# Patient Record
Sex: MALE
[De-identification: names, ages, dates, MRNs, and addresses within clinical notes are randomized per-mention and may not be internally consistent; named-entity substitution may affect disease eponyms.]

---

## 2021-11-17 ENCOUNTER — HOSPITAL ENCOUNTER (INPATIENT)
Dept: HOSPITAL 5 - ED | Age: 46
LOS: 4 days | Discharge: HOME HEALTH | DRG: 603 | End: 2021-11-21
Attending: INTERNAL MEDICINE | Admitting: INTERNAL MEDICINE
Payer: SELF-PAY

## 2021-11-17 DIAGNOSIS — I10: ICD-10-CM

## 2021-11-17 DIAGNOSIS — M86.8X7: ICD-10-CM

## 2021-11-17 DIAGNOSIS — L03.115: Primary | ICD-10-CM

## 2021-11-17 LAB
ALBUMIN SERPL-MCNC: 4.2 G/DL (ref 3.9–5)
ALT SERPL-CCNC: 27 UNITS/L (ref 7–56)
BASOPHILS # (AUTO): 0.1 K/MM3 (ref 0–0.1)
BASOPHILS NFR BLD AUTO: 1.7 % (ref 0–1.8)
BUN SERPL-MCNC: 18 MG/DL (ref 9–20)
BUN/CREAT SERPL: 36 %
CALCIUM SERPL-MCNC: 9.1 MG/DL (ref 8.4–10.2)
EOSINOPHIL # BLD AUTO: 0.2 K/MM3 (ref 0–0.4)
EOSINOPHIL NFR BLD AUTO: 2 % (ref 0–4.3)
HCT VFR BLD CALC: 48.2 % (ref 35.5–45.6)
HEMOLYSIS INDEX: 5
HGB BLD-MCNC: 15.4 GM/DL (ref 11.8–15.2)
LYMPHOCYTES # BLD AUTO: 1.1 K/MM3 (ref 1.2–5.4)
LYMPHOCYTES NFR BLD AUTO: 13.2 % (ref 13.4–35)
MCHC RBC AUTO-ENTMCNC: 32 % (ref 32–34)
MCV RBC AUTO: 88 FL (ref 84–94)
MONOCYTES # (AUTO): 0.7 K/MM3 (ref 0–0.8)
MONOCYTES % (AUTO): 8.7 % (ref 0–7.3)
PLATELET # BLD: 309 K/MM3 (ref 140–440)
RBC # BLD AUTO: 5.47 M/MM3 (ref 3.65–5.03)

## 2021-11-17 PROCEDURE — 87186 SC STD MICRODIL/AGAR DIL: CPT

## 2021-11-17 PROCEDURE — 36415 COLL VENOUS BLD VENIPUNCTURE: CPT

## 2021-11-17 PROCEDURE — 87076 CULTURE ANAEROBE IDENT EACH: CPT

## 2021-11-17 PROCEDURE — 83036 HEMOGLOBIN GLYCOSYLATED A1C: CPT

## 2021-11-17 PROCEDURE — 87116 MYCOBACTERIA CULTURE: CPT

## 2021-11-17 PROCEDURE — 85025 COMPLETE CBC W/AUTO DIFF WBC: CPT

## 2021-11-17 PROCEDURE — 80053 COMPREHEN METABOLIC PANEL: CPT

## 2021-11-17 PROCEDURE — 80202 ASSAY OF VANCOMYCIN: CPT

## 2021-11-17 PROCEDURE — 85652 RBC SED RATE AUTOMATED: CPT

## 2021-11-17 NOTE — EMERGENCY DEPARTMENT REPORT
ED General Adult HPI





- General


Chief complaint: Wound/Laceration


Stated complaint: ANTIBIOTICS


Time Seen by Provider: 11/17/21 17:58


Source: patient


Mode of arrival: Ambulatory


Limitations: No Limitations





- History of Present Illness


Initial comments: 





Patient is 46 years old morbidly obese male with history of hypertension.  

Patient was sent to the emergency room by his primary care physician for 

evaluation of right lower leg wound and cellulitis.  Patient stated that his 

wound started approximately 1 month ago when he has a small laceration.  He st

ated that he put peroxide on it hoping that will help.  He stated that 

approximately 1 week ago when he went to his primary doctor he started him on 

Augmentin however there is no improvement.  Patient stated that he noticed for 

the last few days that the area around the wounds became more tender and red.  

Patient denied any fever or chills.  No nausea or vomiting.





- Related Data


                                    Allergies











Allergy/AdvReac Type Severity Reaction Status Date / Time


 


No Known Allergies Allergy   Verified 11/17/21 15:35














ED Review of Systems


ROS: 


Stated complaint: ANTIBIOTICS


Other details as noted in HPI





Comment: All other systems reviewed and negative


Constitutional: denies: chills, fever


Respiratory: denies: cough, shortness of breath, SOB with exertion, SOB at rest,

wheezing


Cardiovascular: denies: chest pain, palpitations


Gastrointestinal: denies: abdominal pain, nausea, vomiting, diarrhea, co

nstipation, hematochezia


Musculoskeletal: denies: back pain


Skin: lesions


Neurological: denies: headache, weakness, numbness, paresthesias, confusion





ED Physical Exam





- General


Limitations: No Limitations


General appearance: alert, in no apparent distress





- Head


Head exam: Present: atraumatic, normocephalic, normal inspection





- Eye


Eye exam: Present: normal appearance





- ENT


ENT exam: Present: normal exam, normal orophraynx, mucous membranes moist





- Neck


Neck exam: Present: normal inspection, full ROM.  Absent: tenderness, 

meningismus, lymphadenopathy, thyromegaly





- Respiratory


Respiratory exam: Present: normal lung sounds bilaterally





- Cardiovascular


Cardiovascular Exam: Present: regular rate, normal rhythm, normal heart sounds





- GI/Abdominal


GI/Abdominal exam: Present: soft, normal bowel sounds.  Absent: distended, 

tenderness, guarding, rebound, rigid, organomegaly, mass, bruit, pulsatile mass,

hernia





- Extremities Exam


Extremities exam: Present: other (Right lower leg wound approximately 5 x 5 cm 

with surrounding area of cellulitis.)





- Back Exam


Back exam: Present: normal inspection.  Absent: CVA tenderness (R), CVA 

tenderness (L)





- Neurological Exam


Neurological exam: Present: alert, oriented X3, CN II-XII intact





- Psychiatric


Psychiatric exam: Present: normal mood





- Skin


Skin exam: Present: erythema





ED Course





                                   Vital Signs











  11/17/21





  15:33


 


Temperature 98.2 F


 


Pulse Rate 90


 


Respiratory 18





Rate 


 


Blood Pressure 149/83





[Left] 


 


O2 Sat by Pulse 100





Oximetry 














ED Medical Decision Making





- Lab Data


Result diagrams: 


                                 11/17/21 16:57





                                 11/17/21 16:57





- Radiology Data


Radiology results: report reviewed





- Medical Decision Making





Patient is 46 years old morbidly obese male with history of hypertension.  

Patient was sent to the emergency room by his primary care physician for 

evaluation of right lower leg wound and cellulitis.  Patient stated that his 

wound started approximately 1 month ago when he has a small laceration.  He 

stated that he put peroxide on it hoping that will help.  He stated that 

approximately 1 week ago when he went to his primary doctor he started him on 

Augmentin however there is no improvement.  Patient stated that he noticed for 

the last few days that the area around the wounds became more tender and red.  

Patient denied any fever or chills.  No nausea or vomiting.





On examination there is no clinical evidence of necrotizing fasciitis.  Labs 

reviewed and is unremarkable.  Patient started on clindamycin and Rocephin.  I 

discussed the patient with Dr. Bell, he agreed to admit the patient to medical 

service for further management.


Critical care attestation.: 


If time is entered above; I have spent that time in minutes in the direct care 

of this critically ill patient, excluding procedure time.








ED Disposition


Clinical Impression: 


 Cellulitis of right leg





Disposition: 09 ADMITTED AS INPATIENT


Is pt being admited?: Yes


Condition: Stable


Referrals: 


PRIMARY CARE,MD [Primary Care Provider] - 3-5 Days

## 2021-11-17 NOTE — EVENT NOTE
Date: 11/17/21





Received phone call from primary care doctor: Dr. Iniguez.  Patient is a 

46-year-old gentleman, with a history of body mass index of 50, hypertension on 

multiple agents, being sent to the emergency room for evaluation by his 

physician for chronic wound, history of uncontrolled blood pressure, concern for

cellulitis and possible superinfection.

## 2021-11-17 NOTE — XRAY REPORT
Right foreleg 4 views



INDICATION: Severe right foreleg pain and swelling



IMPRESSION: There is prominent subcutaneous edema throughout the right foreleg. The tibia and fibula 
appears grossly intact without evidence of osseous destruction.



Signer Name: Shawn Morelos MD 

Signed: 11/17/2021 6:09 PM

Workstation Name: [id number]

## 2021-11-18 RX ADMIN — Medication SCH ML: at 22:00

## 2021-11-18 RX ADMIN — VANCOMYCIN HYDROCHLORIDE SCH MLS/HR: 5 INJECTION, POWDER, LYOPHILIZED, FOR SOLUTION INTRAVENOUS at 20:00

## 2021-11-18 RX ADMIN — HEPARIN SODIUM SCH UNIT: 5000 INJECTION, SOLUTION INTRAVENOUS; SUBCUTANEOUS at 22:00

## 2021-11-18 RX ADMIN — VANCOMYCIN HYDROCHLORIDE SCH MLS/HR: 5 INJECTION, POWDER, LYOPHILIZED, FOR SOLUTION INTRAVENOUS at 08:29

## 2021-11-18 RX ADMIN — AMPICILLIN AND SULBACTAM SCH MLS/HR: 1; 2 INJECTION, POWDER, FOR SOLUTION INTRAMUSCULAR; INTRAVENOUS at 12:51

## 2021-11-18 RX ADMIN — FAMOTIDINE SCH MG: 20 TABLET ORAL at 10:27

## 2021-11-18 RX ADMIN — AMPICILLIN AND SULBACTAM SCH MLS/HR: 1; 2 INJECTION, POWDER, FOR SOLUTION INTRAMUSCULAR; INTRAVENOUS at 07:00

## 2021-11-18 RX ADMIN — FAMOTIDINE SCH MG: 20 TABLET ORAL at 22:00

## 2021-11-18 RX ADMIN — AMPICILLIN AND SULBACTAM SCH MLS/HR: 1; 2 INJECTION, POWDER, FOR SOLUTION INTRAMUSCULAR; INTRAVENOUS at 18:18

## 2021-11-18 RX ADMIN — HEPARIN SODIUM SCH UNIT: 5000 INJECTION, SOLUTION INTRAVENOUS; SUBCUTANEOUS at 10:27

## 2021-11-18 RX ADMIN — OXYCODONE AND ACETAMINOPHEN PRN TAB: 5; 325 TABLET ORAL at 18:21

## 2021-11-18 RX ADMIN — Medication SCH ML: at 12:50

## 2021-11-18 NOTE — HISTORY AND PHYSICAL REPORT
History of Present Illness


Date of examination: 11/17/21


Date of admission: 


11/18/21 00:20





History of present illness: 





Patient is 46 years old morbidly obese male with history of hypertension.  

Patient was sent to the emergency room by his primary care physician for 

evaluation of right lower leg wound and cellulitis.  Patient stated that his 

wound started approximately 1 month ago when he has a small laceration.  He 

stated that he put peroxide on it hoping that will help.  He stated that 

approximately 1 week ago when he went to his primary doctor he started him on 

Augmentin however there is no improvement.  Patient stated that he noticed for 

the last few days that the area around the wounds became more tender and red.  

Patient denied any fever or chills.  No nausea or vomiting.





- Related Data


                                    Allergies











Allergy/AdvReac Type Severity Reaction Status Date / Time


 


No Known Allergies Allergy   Verified 11/17/21 15:35














Review of Systems


ROS: 


Stated complaint: ANTIBIOTICS


Other details as noted in HPI





Comment: All other systems reviewed and negative


Constitutional: denies: chills, fever


Respiratory: denies: cough, shortness of breath, SOB with exertion, SOB at rest,

wheezing


Cardiovascular: denies: chest pain, palpitations


Gastrointestinal: denies: abdominal pain, nausea, vomiting, diarrhea, 

constipation, hematochezia


Musculoskeletal: denies: back pain


Skin: lesions


Neurological: denies: headache, weakness, numbness, paresthesias, confusion








Medications and Allergies


                                    Allergies











Allergy/AdvReac Type Severity Reaction Status Date / Time


 


No Known Allergies Allergy   Verified 11/17/21 15:35











                                Home Medications











 Medication  Instructions  Recorded  Confirmed  Last Taken  Type


 


Labetalol HCl [Labetalol 300mg TAB] 300 mg PO BID 11/18/21 11/18/21 11/17/21 

21:00 History





    300 


 


Lasix TAB 40 mg PO QDAY 11/18/21 11/18/21 11/17/21 21:00 History





    40 


 


NIFEdipine 60 mg PO QDAY 11/18/21 11/18/21 11/17/21 21:00 History





    60 


 


Potassium 20 mg PO QDAY 11/18/21 11/18/21 11/17/21 21:00 History





    20 














Exam





- Constitutional


Vitals: 


                                        











Temp Pulse Resp BP Pulse Ox


 


 98.2 F   93 H  16   147/80   95 


 


 11/17/21 15:33  11/18/21 05:56  11/18/21 05:56  11/18/21 05:56  11/18/21 05:56














Results





- Labs


CBC & Chem 7: 


                                 11/17/21 16:57





                                 11/17/21 16:57


Labs: 


                             Laboratory Last Values











WBC  8.5 K/mm3 (4.5-11.0)   11/17/21  16:57    


 


RBC  5.47 M/mm3 (3.65-5.03)  H  11/17/21  16:57    


 


Hgb  15.4 gm/dl (11.8-15.2)  H  11/17/21  16:57    


 


Hct  48.2 % (35.5-45.6)  H  11/17/21  16:57    


 


MCV  88 fl (84-94)   11/17/21  16:57    


 


MCH  28 pg (28-32)   11/17/21  16:57    


 


MCHC  32 % (32-34)   11/17/21  16:57    


 


RDW  14.0 % (13.2-15.2)   11/17/21  16:57    


 


Plt Count  309 K/mm3 (140-440)   11/17/21  16:57    


 


Lymph % (Auto)  13.2 % (13.4-35.0)  L  11/17/21  16:57    


 


Mono % (Auto)  8.7 % (0.0-7.3)  H  11/17/21  16:57    


 


Eos % (Auto)  2.0 % (0.0-4.3)   11/17/21  16:57    


 


Baso % (Auto)  1.7 % (0.0-1.8)   11/17/21  16:57    


 


Lymph # (Auto)  1.1 K/mm3 (1.2-5.4)  L  11/17/21  16:57    


 


Mono # (Auto)  0.7 K/mm3 (0.0-0.8)   11/17/21  16:57    


 


Eos # (Auto)  0.2 K/mm3 (0.0-0.4)   11/17/21  16:57    


 


Baso # (Auto)  0.1 K/mm3 (0.0-0.1)   11/17/21  16:57    


 


Seg Neutrophils %  74.4 % (40.0-70.0)  H  11/17/21  16:57    


 


Seg Neutrophils #  6.3 K/mm3 (1.8-7.7)   11/17/21  16:57    


 


ESR  80 mm/Hr (0-20)   11/17/21  16:57    


 


Sodium  136 mmol/L (137-145)  L  11/17/21  16:57    


 


Potassium  3.9 mmol/L (3.6-5.0)   11/17/21  16:57    


 


Chloride  96.8 mmol/L ()  L  11/17/21  16:57    


 


Carbon Dioxide  25 mmol/L (22-30)   11/17/21  16:57    


 


Anion Gap  18 mmol/L  11/17/21  16:57    


 


BUN  18 mg/dL (9-20)   11/17/21  16:57    


 


Creatinine  0.5 mg/dL (0.8-1.3)  L  11/17/21  16:57    


 


Estimated GFR  > 60 ml/min  11/17/21  16:57    


 


BUN/Creatinine Ratio  36 %  11/17/21  16:57    


 


Glucose  105 mg/dL ()  H  11/17/21  16:57    


 


Calcium  9.1 mg/dL (8.4-10.2)   11/17/21  16:57    


 


Total Bilirubin  0.40 mg/dL (0.1-1.2)   11/17/21  16:57    


 


AST  35 units/L (5-40)   11/17/21  16:57    


 


ALT  27 units/L (7-56)   11/17/21  16:57    


 


Alkaline Phosphatase  88 units/L ()   11/17/21  16:57    


 


Total Protein  7.8 g/dL (6.3-8.2)   11/17/21  16:57    


 


Albumin  4.2 g/dL (3.9-5)   11/17/21  16:57    


 


Albumin/Globulin Ratio  1.2 %  11/17/21  16:57    














Assessment and Plan


Advance Directives: Yes (Full code)


VTE prophylaxis?: Chemical


Plan of care discussed with patient/family: Yes





- Patient Problems


(1) Cellulitis of right leg


Current Visit: Yes   Status: Acute   


Plan to address problem: 


Patient started on IV Unasyn and vancomycin

## 2021-11-19 LAB
ALBUMIN SERPL-MCNC: 3.6 G/DL (ref 3.9–5)
ALT SERPL-CCNC: 21 UNITS/L (ref 7–56)
BASOPHILS # (AUTO): 0 K/MM3 (ref 0–0.1)
BASOPHILS NFR BLD AUTO: 0.5 % (ref 0–1.8)
BUN SERPL-MCNC: 7 MG/DL (ref 9–20)
BUN/CREAT SERPL: 14 %
CALCIUM SERPL-MCNC: 8.9 MG/DL (ref 8.4–10.2)
EOSINOPHIL # BLD AUTO: 0.2 K/MM3 (ref 0–0.4)
EOSINOPHIL NFR BLD AUTO: 2.1 % (ref 0–4.3)
HCT VFR BLD CALC: 46.6 % (ref 35.5–45.6)
HEMOLYSIS INDEX: 5
HGB BLD-MCNC: 15.1 GM/DL (ref 11.8–15.2)
LYMPHOCYTES # BLD AUTO: 1.2 K/MM3 (ref 1.2–5.4)
LYMPHOCYTES NFR BLD AUTO: 14 % (ref 13.4–35)
MCHC RBC AUTO-ENTMCNC: 33 % (ref 32–34)
MCV RBC AUTO: 88 FL (ref 84–94)
MONOCYTES # (AUTO): 0.8 K/MM3 (ref 0–0.8)
MONOCYTES % (AUTO): 9.7 % (ref 0–7.3)
PLATELET # BLD: 262 K/MM3 (ref 140–440)
RBC # BLD AUTO: 5.31 M/MM3 (ref 3.65–5.03)

## 2021-11-19 RX ADMIN — OXYCODONE AND ACETAMINOPHEN PRN TAB: 5; 325 TABLET ORAL at 23:38

## 2021-11-19 RX ADMIN — OXYCODONE AND ACETAMINOPHEN PRN TAB: 5; 325 TABLET ORAL at 16:59

## 2021-11-19 RX ADMIN — OXYCODONE AND ACETAMINOPHEN PRN TAB: 5; 325 TABLET ORAL at 09:58

## 2021-11-19 RX ADMIN — AMPICILLIN AND SULBACTAM SCH MLS/HR: 1; 2 INJECTION, POWDER, FOR SOLUTION INTRAMUSCULAR; INTRAVENOUS at 00:00

## 2021-11-19 RX ADMIN — AMPICILLIN AND SULBACTAM SCH MLS/HR: 1; 2 INJECTION, POWDER, FOR SOLUTION INTRAMUSCULAR; INTRAVENOUS at 23:30

## 2021-11-19 RX ADMIN — FAMOTIDINE SCH MG: 20 TABLET ORAL at 23:35

## 2021-11-19 RX ADMIN — Medication SCH ML: at 23:35

## 2021-11-19 RX ADMIN — HEPARIN SODIUM SCH UNIT: 5000 INJECTION, SOLUTION INTRAVENOUS; SUBCUTANEOUS at 23:35

## 2021-11-19 RX ADMIN — AMPICILLIN AND SULBACTAM SCH MLS/HR: 1; 2 INJECTION, POWDER, FOR SOLUTION INTRAMUSCULAR; INTRAVENOUS at 12:32

## 2021-11-19 RX ADMIN — SODIUM CHLORIDE SCH MLS/HR: 0.9 INJECTION, SOLUTION INTRAVENOUS at 09:51

## 2021-11-19 RX ADMIN — AMPICILLIN AND SULBACTAM SCH MLS/HR: 1; 2 INJECTION, POWDER, FOR SOLUTION INTRAMUSCULAR; INTRAVENOUS at 17:00

## 2021-11-19 RX ADMIN — OXYCODONE AND ACETAMINOPHEN PRN TAB: 5; 325 TABLET ORAL at 01:35

## 2021-11-19 RX ADMIN — VANCOMYCIN HYDROCHLORIDE SCH MLS/HR: 5 INJECTION, POWDER, LYOPHILIZED, FOR SOLUTION INTRAVENOUS at 09:50

## 2021-11-19 RX ADMIN — AMPICILLIN AND SULBACTAM SCH: 1; 2 INJECTION, POWDER, FOR SOLUTION INTRAMUSCULAR; INTRAVENOUS at 07:44

## 2021-11-19 RX ADMIN — VANCOMYCIN HYDROCHLORIDE SCH MLS/HR: 5 INJECTION, POWDER, LYOPHILIZED, FOR SOLUTION INTRAVENOUS at 23:34

## 2021-11-19 RX ADMIN — Medication SCH ML: at 09:51

## 2021-11-19 RX ADMIN — FAMOTIDINE SCH MG: 20 TABLET ORAL at 09:50

## 2021-11-19 RX ADMIN — HEPARIN SODIUM SCH UNIT: 5000 INJECTION, SOLUTION INTRAVENOUS; SUBCUTANEOUS at 09:51

## 2021-11-19 NOTE — PROGRESS NOTE
Assessment and Plan





- Patient Problems


(1) Cellulitis of right leg


Current Visit: Yes   Status: Acute   


Plan to address problem: 


Patient started on IV Unasyn and vancomycin








Subjective


Date of service: 11/18/21





Objective





- Constitutional


Vitals: 


                               Vital Signs - 12hr











  11/18/21 11/19/21 11/19/21





  22:33 00:16 01:35


 


Temperature 99.1 F  


 


Pulse Rate 88  


 


Respiratory 20  20





Rate   


 


Blood Pressure 153/92  


 


O2 Sat by Pulse 95 97 





Oximetry   














  11/19/21 11/19/21





  02:35 05:08


 


Temperature  99.0 F


 


Pulse Rate  95 H


 


Respiratory 18 20





Rate  


 


Blood Pressure  150/97


 


O2 Sat by Pulse  94





Oximetry  











General appearance: Present: no acute distress, well-nourished





- EENT


Eyes: PERRL, EOM intact


ENT: hearing intact, clear oral mucosa


Ears: bilateral: normal





- Neck


Neck: supple, normal ROM





- Respiratory


Respiratory effort: normal


Respiratory: bilateral: CTA





- Breasts


Breasts: normal





- Cardiovascular


Rhythm: regular


Heart Sounds: Present: S1 & S2.  Absent: gallop, rub


Extremities: pulses intact, No edema, normal color, Full ROM





- Gastrointestinal


General gastrointestinal: Present: soft, non-tender, non-distended, normal bowel

sounds





- Genitourinary


Male genitourinary: normal





- Integumentary


Integumentary: clear, warm, dry





- Musculoskeletal


Musculoskeletal: 1, strength equal bilaterally





- Neurologic


Neurologic: moves all extremities





- Psychiatric


Psychiatric: memory intact, appropriate mood/affect, intact judgment & insight





- Labs


CBC & Chem 7: 


                                 11/19/21 05:47





                                 11/19/21 05:47


Labs: 


                              Abnormal lab results











  11/19/21 11/19/21 Range/Units





  05:47 05:47 


 


RBC  5.31 H   (3.65-5.03)  M/mm3


 


Hct  46.6 H   (35.5-45.6)  %


 


Mono % (Auto)  9.7 H   (0.0-7.3)  %


 


Seg Neutrophils %  73.7 H   (40.0-70.0)  %


 


Potassium   3.4 L  (3.6-5.0)  mmol/L


 


BUN   7 L  (9-20)  mg/dL


 


Creatinine   0.5 L  (0.8-1.3)  mg/dL


 


Glucose   105 H  ()  mg/dL


 


Albumin   3.6 L  (3.9-5)  g/dL

## 2021-11-19 NOTE — PROGRESS NOTE
Assessment and Plan





- Patient Problems


(1) Cellulitis of right leg


Current Visit: Yes   Status: Acute   


Plan to address problem: 


Patient started on IV Unasyn and vancomycin








Subjective


Date of service: 11/19/21





Objective





- Constitutional


Vitals: 


                               Vital Signs - 12hr











  11/19/21 11/19/21 11/19/21





  10:58 11:33 17:27


 


Temperature  97.7 F 99.1 F


 


Pulse Rate  91 H 88


 


Respiratory 18 18 19





Rate   


 


Blood Pressure  156/98 144/64


 


O2 Sat by Pulse  92 93





Oximetry   














  11/19/21





  17:59


 


Temperature 


 


Pulse Rate 


 


Respiratory 19





Rate 


 


Blood Pressure 


 


O2 Sat by Pulse 





Oximetry 











General appearance: Present: no acute distress, well-nourished





- EENT


Eyes: PERRL, EOM intact


ENT: hearing intact, clear oral mucosa


Ears: bilateral: normal





- Neck


Neck: supple, normal ROM





- Respiratory


Respiratory effort: normal


Respiratory: bilateral: CTA





- Breasts


Breasts: normal





- Cardiovascular


Heart rate: 78


Rhythm: regular


Heart Sounds: Present: S1 & S2.  Absent: gallop, rub


Extremities: pulses intact, No edema, normal color, Full ROM





- Gastrointestinal


General gastrointestinal: Present: soft, non-tender, non-distended, normal bowel

sounds





- Genitourinary


Male genitourinary: normal





- Integumentary


Integumentary: clear, warm, dry





- Musculoskeletal


Musculoskeletal: 1, strength equal bilaterally





- Neurologic


Neurologic: moves all extremities





- Psychiatric


Psychiatric: memory intact, appropriate mood/affect, intact judgment & insight





- Labs


CBC & Chem 7: 


                                 11/19/21 05:47





                                 11/19/21 05:47


Labs: 


                              Abnormal lab results











  11/19/21 11/19/21 Range/Units





  05:47 05:47 


 


RBC  5.31 H   (3.65-5.03)  M/mm3


 


Hct  46.6 H   (35.5-45.6)  %


 


Mono % (Auto)  9.7 H   (0.0-7.3)  %


 


Seg Neutrophils %  73.7 H   (40.0-70.0)  %


 


Potassium   3.4 L  (3.6-5.0)  mmol/L


 


BUN   7 L  (9-20)  mg/dL


 


Creatinine   0.5 L  (0.8-1.3)  mg/dL


 


Glucose   105 H  ()  mg/dL


 


Albumin   3.6 L  (3.9-5)  g/dL

## 2021-11-20 LAB
ALBUMIN SERPL-MCNC: 3.7 G/DL (ref 3.9–5)
ALT SERPL-CCNC: 20 UNITS/L (ref 7–56)
BASOPHILS # (AUTO): 0 K/MM3 (ref 0–0.1)
BASOPHILS NFR BLD AUTO: 0.5 % (ref 0–1.8)
BUN SERPL-MCNC: 8 MG/DL (ref 9–20)
BUN/CREAT SERPL: 20 %
CALCIUM SERPL-MCNC: 8.6 MG/DL (ref 8.4–10.2)
EOSINOPHIL # BLD AUTO: 0.2 K/MM3 (ref 0–0.4)
EOSINOPHIL NFR BLD AUTO: 2.7 % (ref 0–4.3)
HCT VFR BLD CALC: 46.3 % (ref 35.5–45.6)
HEMOLYSIS INDEX: 5
HGB BLD-MCNC: 14.9 GM/DL (ref 11.8–15.2)
LYMPHOCYTES # BLD AUTO: 1.2 K/MM3 (ref 1.2–5.4)
LYMPHOCYTES NFR BLD AUTO: 15.3 % (ref 13.4–35)
MCHC RBC AUTO-ENTMCNC: 32 % (ref 32–34)
MCV RBC AUTO: 89 FL (ref 84–94)
MONOCYTES # (AUTO): 0.7 K/MM3 (ref 0–0.8)
MONOCYTES % (AUTO): 8.4 % (ref 0–7.3)
PLATELET # BLD: 269 K/MM3 (ref 140–440)
RBC # BLD AUTO: 5.21 M/MM3 (ref 3.65–5.03)

## 2021-11-20 RX ADMIN — HEPARIN SODIUM SCH UNIT: 5000 INJECTION, SOLUTION INTRAVENOUS; SUBCUTANEOUS at 08:00

## 2021-11-20 RX ADMIN — FAMOTIDINE SCH: 20 TABLET ORAL at 11:39

## 2021-11-20 RX ADMIN — OXYCODONE AND ACETAMINOPHEN PRN TAB: 5; 325 TABLET ORAL at 08:00

## 2021-11-20 RX ADMIN — OXYCODONE AND ACETAMINOPHEN PRN TAB: 5; 325 TABLET ORAL at 20:36

## 2021-11-20 RX ADMIN — AMPICILLIN AND SULBACTAM SCH: 1; 2 INJECTION, POWDER, FOR SOLUTION INTRAMUSCULAR; INTRAVENOUS at 16:01

## 2021-11-20 RX ADMIN — AMPICILLIN AND SULBACTAM SCH MLS/HR: 1; 2 INJECTION, POWDER, FOR SOLUTION INTRAMUSCULAR; INTRAVENOUS at 23:19

## 2021-11-20 RX ADMIN — HEPARIN SODIUM SCH: 5000 INJECTION, SOLUTION INTRAVENOUS; SUBCUTANEOUS at 11:40

## 2021-11-20 RX ADMIN — Medication SCH ML: at 21:07

## 2021-11-20 RX ADMIN — Medication SCH: at 11:41

## 2021-11-20 RX ADMIN — AMPICILLIN AND SULBACTAM SCH MLS/HR: 1; 2 INJECTION, POWDER, FOR SOLUTION INTRAMUSCULAR; INTRAVENOUS at 17:36

## 2021-11-20 RX ADMIN — FAMOTIDINE SCH MG: 20 TABLET ORAL at 08:00

## 2021-11-20 RX ADMIN — AMPICILLIN AND SULBACTAM SCH MLS/HR: 1; 2 INJECTION, POWDER, FOR SOLUTION INTRAMUSCULAR; INTRAVENOUS at 06:06

## 2021-11-20 RX ADMIN — VANCOMYCIN HYDROCHLORIDE SCH MLS/HR: 5 INJECTION, POWDER, LYOPHILIZED, FOR SOLUTION INTRAVENOUS at 10:41

## 2021-11-20 RX ADMIN — VANCOMYCIN HYDROCHLORIDE SCH MLS/HR: 5 INJECTION, POWDER, LYOPHILIZED, FOR SOLUTION INTRAVENOUS at 20:56

## 2021-11-20 RX ADMIN — OXYCODONE AND ACETAMINOPHEN PRN TAB: 5; 325 TABLET ORAL at 13:00

## 2021-11-20 RX ADMIN — SODIUM CHLORIDE SCH MLS/HR: 0.9 INJECTION, SOLUTION INTRAVENOUS at 20:36

## 2021-11-20 RX ADMIN — FAMOTIDINE SCH MG: 20 TABLET ORAL at 21:07

## 2021-11-21 VITALS — SYSTOLIC BLOOD PRESSURE: 136 MMHG | DIASTOLIC BLOOD PRESSURE: 71 MMHG

## 2021-11-21 RX ADMIN — FAMOTIDINE SCH MG: 20 TABLET ORAL at 09:45

## 2021-11-21 RX ADMIN — AMPICILLIN AND SULBACTAM SCH MLS/HR: 1; 2 INJECTION, POWDER, FOR SOLUTION INTRAMUSCULAR; INTRAVENOUS at 11:36

## 2021-11-21 RX ADMIN — Medication SCH ML: at 11:36

## 2021-11-21 RX ADMIN — VANCOMYCIN HYDROCHLORIDE SCH: 5 INJECTION, POWDER, LYOPHILIZED, FOR SOLUTION INTRAVENOUS at 08:02

## 2021-11-21 RX ADMIN — AMPICILLIN AND SULBACTAM SCH MLS/HR: 1; 2 INJECTION, POWDER, FOR SOLUTION INTRAMUSCULAR; INTRAVENOUS at 05:32

## 2021-11-21 RX ADMIN — OXYCODONE AND ACETAMINOPHEN PRN TAB: 5; 325 TABLET ORAL at 07:56

## 2021-11-21 NOTE — PROGRESS NOTE
Assessment and Plan





- Patient Problems


(1) Cellulitis of right leg


Current Visit: Yes   Status: Acute   


Plan to address problem: 


Patient started on IV Unasyn and vancomycin








(2) Osteomyelitis of foot, right, acute


Current Visit: Yes   Status: Acute   


Plan to address problem: 


Home IV antibiotics were arranged and patient was discharged


No podiatric services available here








Subjective


Date of service: 11/20/21





Objective





- Constitutional


Vitals: 


                               Vital Signs - 12hr











  11/20/21 11/21/21 11/21/21





  22:44 02:35 05:32


 


Temperature 98.7 F  99.9 F H


 


Pulse Rate 95 H  101 H


 


Respiratory 16 20 20





Rate   


 


Blood Pressure 172/78  171/96


 


O2 Sat by Pulse 95 95 92





Oximetry   











General appearance: Present: no acute distress, well-nourished





- EENT


Eyes: PERRL, EOM intact


ENT: hearing intact, clear oral mucosa


Ears: bilateral: normal





- Neck


Neck: supple, normal ROM





- Respiratory


Respiratory effort: normal


Respiratory: bilateral: CTA





- Breasts


Breasts: normal





- Cardiovascular


Rhythm: regular


Heart Sounds: Present: S1 & S2.  Absent: gallop, rub


Extremities: pulses intact, No edema, normal color, Full ROM





- Gastrointestinal


General gastrointestinal: Present: soft, non-tender, non-distended, normal bowel

sounds





- Genitourinary


Male genitourinary: normal





- Integumentary


Integumentary: clear, warm, dry





- Musculoskeletal


Musculoskeletal: 1, strength equal bilaterally





- Neurologic


Neurologic: moves all extremities





- Psychiatric


Psychiatric: memory intact, appropriate mood/affect, intact judgment & insight





- Labs


CBC & Chem 7: 


                                 11/20/21 05:37





                                 11/20/21 05:37


Labs: 


                              Abnormal lab results











  11/21/21 Range/Units





  00:32 


 


Vancomycin Trough  28.2 H  (5.0-20.0)  ug/mL

## 2021-11-21 NOTE — DISCHARGE SUMMARY
Providers





- Providers


Date of Admission: 


11/18/21 00:20





Attending physician: 


LISA PITTS





                                        





11/18/21 18:24


Consult to Wound/ET Nurse [CONS] Routine 


   Reason For Exam: wound eval











Primary care physician: 


PRIMARY CARE MD








Hospitalization


Condition: Stable


Disposition: 30 STILL A PATIENT





Exam





- Constitutional


Vitals: 


                                        











Temp Pulse Resp BP Pulse Ox


 


 98.3 F   91 H  18   136/71   92 


 


 11/21/21 12:12  11/21/21 12:12  11/21/21 12:12  11/21/21 12:12  11/21/21 12:12














Plan


Follow up with: 


PRIMARY CARE,MD [Primary Care Provider] - 3-5 Days


Prescriptions: 


Sulfamethoxazole/Trimethoprim [Bactrim DS TAB] 1 each PO BID #20 tablet


Oxycodone HCl/Acetaminophen [Percocet 10/325 mg] 1 each PO Q6HR PRN #24 tablet


 PRN Reason: Pain


Other Discharge Orders: 


Home Health Care (Amb) Location: None Selected

## 2022-02-02 ENCOUNTER — HOSPITAL ENCOUNTER (OUTPATIENT)
Dept: HOSPITAL 5 - WOUND | Age: 47
Discharge: HOME | End: 2022-02-02
Attending: SURGERY
Payer: COMMERCIAL
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
## 2022-02-02 DIAGNOSIS — S81.801A: ICD-10-CM
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
## 2022-02-02 DIAGNOSIS — I87.311: Primary | ICD-10-CM

## 2022-02-02 DIAGNOSIS — L97.812: ICD-10-CM

## 2022-02-02 DIAGNOSIS — Z79.899: ICD-10-CM
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
## 2022-02-02 PROCEDURE — G0463 HOSPITAL OUTPT CLINIC VISIT: HCPCS

## 2022-02-02 PROCEDURE — 11042 DBRDMT SUBQ TIS 1ST 20SQCM/<: CPT

## 2022-02-02 PROCEDURE — 99214 OFFICE O/P EST MOD 30 MIN: CPT

## 2022-02-02 PROCEDURE — 11045 DBRDMT SUBQ TISS EACH ADDL: CPT

## 2022-02-07 ENCOUNTER — HOSPITAL ENCOUNTER (OUTPATIENT)
Dept: HOSPITAL 5 - VAS | Age: 47
Discharge: HOME | End: 2022-02-07
Attending: SURGERY
Payer: COMMERCIAL

## 2022-02-07 DIAGNOSIS — I87.311: Primary | ICD-10-CM

## 2022-02-07 NOTE — VASCULAR LAB REPORT
DUPLEX DOPPLER LOWER EXTREMITY VEINS, RIGHT



INDICATION / CLINICAL INFORMATION: I87.311 CHRONIC VENOUS HYPERTENSION.



TECHNIQUE: Duplex doppler imaging was performed through the veins of the right lower extremity using 
venous compression and other maneuvers. Great and small saphenous veins were evaluated in the supine 
and upright/reverse Trendelenburg positions. Valsalva technique was used to evaluate for venous insuf
ficiency.



COMPARISON: 

None available.



FINDINGS:

RIGHT LOWER EXTREMITY DVT (Y/N): No.



RIGHT LOWER EXTREMITY:

Great Saphenous Vein: Size (supine) & Reflux (Y/N)

- Prox: 7 mm. Reflux = Y

- Mid:   3 mm. Reflux = N

- Dist:  3 mm. Reflux = N



Small Saphenous Vein: Not visualized. 





Additional findings: Mild venous insufficiency is noted within the right external iliac vein and comm
on femoral vein.



IMPRESSION:

1. No sonographic evidence for DVT.

2. Mild venous insufficiency within the proximal right greater saphenous vein, external iliac vein, a
nd common femoral vein.



Scribed by: Malini Kerns RDMS, RVT 

Scribed: 2/7/2022 2:00 PM 



 I have reviewed the images, agree with this report, and edited this report as needed.



Signer Name: Shawn Morelos MD 

Signed: 2/7/2022 4:15 PM

Workstation Name: [id number]

## 2022-02-09 ENCOUNTER — HOSPITAL ENCOUNTER (OUTPATIENT)
Dept: HOSPITAL 5 - WOUND | Age: 47
Discharge: HOME | End: 2022-02-09
Attending: SURGERY
Payer: COMMERCIAL
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
## 2022-02-09 DIAGNOSIS — L97.812: ICD-10-CM

## 2022-02-09 DIAGNOSIS — Z79.899: ICD-10-CM

## 2022-02-09 DIAGNOSIS — S81.801D: ICD-10-CM

## 2022-02-09 DIAGNOSIS — I87.311: Primary | ICD-10-CM

## 2022-02-16 ENCOUNTER — HOSPITAL ENCOUNTER (OUTPATIENT)
Dept: HOSPITAL 5 - WOUND | Age: 47
Discharge: HOME | End: 2022-02-16
Attending: SURGERY
Payer: COMMERCIAL

## 2022-02-16 DIAGNOSIS — L97.812: ICD-10-CM

## 2022-02-16 DIAGNOSIS — I87.311: Primary | ICD-10-CM

## 2022-02-16 DIAGNOSIS — Z79.899: ICD-10-CM
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
## 2022-02-16 DIAGNOSIS — S81.801D: ICD-10-CM

## 2023-06-01 ENCOUNTER — DASHBOARD ENCOUNTERS (OUTPATIENT)
Age: 48
End: 2023-06-01

## 2023-06-01 ENCOUNTER — OFFICE VISIT (OUTPATIENT)
Dept: URBAN - METROPOLITAN AREA CLINIC 118 | Facility: CLINIC | Age: 48
End: 2023-06-01
Payer: COMMERCIAL

## 2023-06-01 ENCOUNTER — WEB ENCOUNTER (OUTPATIENT)
Dept: URBAN - METROPOLITAN AREA CLINIC 118 | Facility: CLINIC | Age: 48
End: 2023-06-01

## 2023-06-01 ENCOUNTER — LAB OUTSIDE AN ENCOUNTER (OUTPATIENT)
Dept: URBAN - METROPOLITAN AREA CLINIC 118 | Facility: CLINIC | Age: 48
End: 2023-06-01

## 2023-06-01 VITALS
DIASTOLIC BLOOD PRESSURE: 78 MMHG | HEART RATE: 97 BPM | HEIGHT: 75 IN | SYSTOLIC BLOOD PRESSURE: 135 MMHG | WEIGHT: 315 LBS | BODY MASS INDEX: 39.17 KG/M2

## 2023-06-01 DIAGNOSIS — Z12.11 COLON CANCER SCREENING: ICD-10-CM

## 2023-06-01 PROCEDURE — 99202 OFFICE O/P NEW SF 15 MIN: CPT | Performed by: INTERNAL MEDICINE

## 2023-06-01 NOTE — HPI-TODAY'S VISIT:
Mr. Conroy is a 49 y/o M who is here for a colonoscopy. Denies previous colonoscopy or FMHx of CRC. Denies changes in bowel habits, overt GI bleeding, abd pain or wt loss. Denies heart or lung issues or use of home o2 or blood thinners. Denies following with cardiology.

## 2023-07-31 ENCOUNTER — OFFICE VISIT (OUTPATIENT)
Dept: URBAN - METROPOLITAN AREA MEDICAL CENTER 16 | Facility: MEDICAL CENTER | Age: 48
End: 2023-07-31
Payer: COMMERCIAL

## 2023-07-31 DIAGNOSIS — Z12.11 COLON CANCER SCREENING: ICD-10-CM

## 2023-07-31 PROCEDURE — 45378 DIAGNOSTIC COLONOSCOPY: CPT | Performed by: INTERNAL MEDICINE
